# Patient Record
Sex: MALE | Race: WHITE | NOT HISPANIC OR LATINO | Employment: UNEMPLOYED | URBAN - METROPOLITAN AREA
[De-identification: names, ages, dates, MRNs, and addresses within clinical notes are randomized per-mention and may not be internally consistent; named-entity substitution may affect disease eponyms.]

---

## 2020-03-27 PROBLEM — R10.9 ABDOMINAL PAIN: Status: ACTIVE | Noted: 2020-03-27

## 2020-05-12 PROBLEM — M54.2 NECK PAIN: Status: ACTIVE | Noted: 2020-05-12

## 2020-05-12 PROBLEM — M25.512 LEFT SHOULDER PAIN: Status: ACTIVE | Noted: 2020-05-12

## 2020-08-26 ENCOUNTER — TELEPHONE (OUTPATIENT)
Dept: RHEUMATOLOGY | Facility: CLINIC | Age: 45
End: 2020-08-26

## 2020-08-26 NOTE — TELEPHONE ENCOUNTER
Pt sister Shanita Hood will set up pt portal. He has been scheduled for the first available with Dr. Irvin and added to the wait list

## 2020-10-06 PROBLEM — M54.2 NECK PAIN: Status: RESOLVED | Noted: 2020-05-12 | Resolved: 2020-10-06

## 2020-10-06 PROBLEM — M25.512 LEFT SHOULDER PAIN: Status: RESOLVED | Noted: 2020-05-12 | Resolved: 2020-10-06

## 2020-12-03 PROBLEM — M54.12 CERVICAL RADICULOPATHY: Status: ACTIVE | Noted: 2020-12-03

## 2021-03-11 ENCOUNTER — TELEPHONE (OUTPATIENT)
Dept: RHEUMATOLOGY | Facility: CLINIC | Age: 46
End: 2021-03-11

## 2021-03-29 ENCOUNTER — TELEPHONE (OUTPATIENT)
Dept: ORTHOPEDICS | Facility: CLINIC | Age: 46
End: 2021-03-29

## 2021-04-29 ENCOUNTER — PATIENT MESSAGE (OUTPATIENT)
Dept: RESEARCH | Facility: HOSPITAL | Age: 46
End: 2021-04-29

## 2021-05-03 ENCOUNTER — TELEPHONE (OUTPATIENT)
Dept: RHEUMATOLOGY | Facility: CLINIC | Age: 46
End: 2021-05-03

## 2021-05-03 ENCOUNTER — OFFICE VISIT (OUTPATIENT)
Dept: RHEUMATOLOGY | Facility: CLINIC | Age: 46
End: 2021-05-03
Payer: MEDICAID

## 2021-05-03 DIAGNOSIS — L40.9 PSORIASIS: Primary | ICD-10-CM

## 2021-05-03 DIAGNOSIS — M45.0 ANKYLOSING SPONDYLITIS OF MULTIPLE SITES IN SPINE: ICD-10-CM

## 2021-05-03 DIAGNOSIS — E03.9 ACQUIRED HYPOTHYROIDISM: ICD-10-CM

## 2021-05-03 DIAGNOSIS — F41.8 ANXIETY WITH DEPRESSION: ICD-10-CM

## 2021-05-03 DIAGNOSIS — R79.89 LFT ELEVATION: ICD-10-CM

## 2021-05-03 DIAGNOSIS — K21.9 GASTROESOPHAGEAL REFLUX DISEASE WITHOUT ESOPHAGITIS: ICD-10-CM

## 2021-05-03 DIAGNOSIS — I77.9: ICD-10-CM

## 2021-05-03 PROCEDURE — 99212 OFFICE O/P EST SF 10 MIN: CPT | Mod: PBBFAC,PO,25 | Performed by: INTERNAL MEDICINE

## 2021-05-03 PROCEDURE — 99205 OFFICE O/P NEW HI 60 MIN: CPT | Mod: 25,S$PBB,, | Performed by: INTERNAL MEDICINE

## 2021-05-03 PROCEDURE — 99205 PR OFFICE/OUTPT VISIT, NEW, LEVL V, 60-74 MIN: ICD-10-PCS | Mod: 25,S$PBB,, | Performed by: INTERNAL MEDICINE

## 2021-05-03 PROCEDURE — 99999 PR PBB SHADOW E&M-EST. PATIENT-LVL II: ICD-10-PCS | Mod: PBBFAC,,, | Performed by: INTERNAL MEDICINE

## 2021-05-03 PROCEDURE — 96372 THER/PROPH/DIAG INJ SC/IM: CPT | Mod: PBBFAC,PO

## 2021-05-03 PROCEDURE — 99999 PR PBB SHADOW E&M-EST. PATIENT-LVL II: CPT | Mod: PBBFAC,,, | Performed by: INTERNAL MEDICINE

## 2021-05-03 RX ORDER — THYROID 30 MG/1
30 TABLET ORAL
Qty: 30 TABLET | Refills: 11 | Status: SHIPPED | OUTPATIENT
Start: 2021-05-03 | End: 2022-05-04

## 2021-05-03 RX ORDER — DEXLANSOPRAZOLE 60 MG/1
1 CAPSULE, DELAYED RELEASE ORAL DAILY
COMMUNITY
Start: 2021-04-08

## 2021-05-03 RX ORDER — KETOROLAC TROMETHAMINE 30 MG/ML
60 INJECTION, SOLUTION INTRAMUSCULAR; INTRAVENOUS
Status: COMPLETED | OUTPATIENT
Start: 2021-05-03 | End: 2021-05-03

## 2021-05-03 RX ORDER — CLONAZEPAM 0.5 MG/1
0.5 TABLET ORAL
COMMUNITY

## 2021-05-03 RX ORDER — DICLOFENAC SODIUM AND MISOPROSTOL 75; 200 MG/1; UG/1
1 TABLET, DELAYED RELEASE ORAL 2 TIMES DAILY
Qty: 60 TABLET | Refills: 11 | Status: SHIPPED | OUTPATIENT
Start: 2021-05-03 | End: 2022-05-17

## 2021-05-03 RX ORDER — CALCIUM CITRATE/VITAMIN D3 200MG-6.25
TABLET ORAL
COMMUNITY
Start: 2021-04-08 | End: 2022-06-13

## 2021-05-03 RX ORDER — CYANOCOBALAMIN 1000 UG/ML
1000 INJECTION, SOLUTION INTRAMUSCULAR; SUBCUTANEOUS
Status: COMPLETED | OUTPATIENT
Start: 2021-05-03 | End: 2021-05-03

## 2021-05-03 RX ORDER — METFORMIN HYDROCHLORIDE EXTENDED-RELEASE TABLETS 1000 MG/1
TABLET, FILM COATED, EXTENDED RELEASE ORAL
COMMUNITY
Start: 2021-04-08

## 2021-05-03 RX ADMIN — KETOROLAC TROMETHAMINE 60 MG: 60 INJECTION, SOLUTION INTRAMUSCULAR at 11:05

## 2021-05-03 RX ADMIN — CYANOCOBALAMIN 1000 MCG: 1000 INJECTION, SOLUTION INTRAMUSCULAR at 11:05

## 2021-05-03 ASSESSMENT — ROUTINE ASSESSMENT OF PATIENT INDEX DATA (RAPID3)
PAIN SCORE: 5.5
TOTAL RAPID3 SCORE: 4.17
PATIENT GLOBAL ASSESSMENT SCORE: 5
MDHAQ FUNCTION SCORE: 0.6
PSYCHOLOGICAL DISTRESS SCORE: 2.2

## 2021-05-05 ENCOUNTER — TELEPHONE (OUTPATIENT)
Dept: RHEUMATOLOGY | Facility: CLINIC | Age: 46
End: 2021-05-05

## 2021-05-05 ENCOUNTER — DOCUMENTATION ONLY (OUTPATIENT)
Dept: RHEUMATOLOGY | Facility: CLINIC | Age: 46
End: 2021-05-05

## 2021-05-12 ENCOUNTER — PATIENT MESSAGE (OUTPATIENT)
Dept: RESEARCH | Facility: HOSPITAL | Age: 46
End: 2021-05-12

## 2021-07-12 ENCOUNTER — OFFICE VISIT (OUTPATIENT)
Dept: RHEUMATOLOGY | Facility: CLINIC | Age: 46
End: 2021-07-12
Payer: MEDICAID

## 2021-07-12 DIAGNOSIS — K90.41 NCGS (NON-CELIAC GLUTEN SENSITIVITY): ICD-10-CM

## 2021-07-12 DIAGNOSIS — L40.9 PSORIASIS: Primary | ICD-10-CM

## 2021-07-12 DIAGNOSIS — E06.3 HASHIMOTO'S THYROIDITIS: ICD-10-CM

## 2021-07-12 DIAGNOSIS — M45.0 ANKYLOSING SPONDYLITIS OF MULTIPLE SITES IN SPINE: ICD-10-CM

## 2021-07-12 PROCEDURE — 99215 PR OFFICE/OUTPT VISIT, EST, LEVL V, 40-54 MIN: ICD-10-PCS | Mod: 95,,, | Performed by: INTERNAL MEDICINE

## 2021-07-12 PROCEDURE — 99215 OFFICE O/P EST HI 40 MIN: CPT | Mod: 95,,, | Performed by: INTERNAL MEDICINE

## 2021-07-12 RX ORDER — HYDROXYCHLOROQUINE SULFATE 200 MG/1
200 TABLET, FILM COATED ORAL 2 TIMES DAILY
Qty: 60 TABLET | Refills: 6 | Status: CANCELLED | OUTPATIENT
Start: 2021-07-12 | End: 2021-08-11

## 2021-07-15 ENCOUNTER — TELEPHONE (OUTPATIENT)
Dept: RHEUMATOLOGY | Facility: CLINIC | Age: 46
End: 2021-07-15

## 2021-07-18 RX ORDER — HYDROXYCHLOROQUINE SULFATE 200 MG/1
200 TABLET, FILM COATED ORAL 2 TIMES DAILY
Qty: 60 TABLET | Refills: 3 | Status: SHIPPED | OUTPATIENT
Start: 2021-07-18

## 2022-03-10 ENCOUNTER — PATIENT MESSAGE (OUTPATIENT)
Dept: RHEUMATOLOGY | Facility: CLINIC | Age: 47
End: 2022-03-10
Payer: COMMERCIAL

## 2022-04-07 ENCOUNTER — DOCUMENTATION ONLY (OUTPATIENT)
Dept: PHARMACY | Facility: CLINIC | Age: 47
End: 2022-04-07
Payer: COMMERCIAL

## 2022-04-07 NOTE — PROGRESS NOTES
RECEIVED A PA FOR PT FOR DICLOFENAC- MISOPROSTOL 75-0.2MG. NO PA WAS NEEDED    RX NOTIFIED    JAVIER FERNANDEZ CPHT  MED ACCESS  04/07/22

## 2022-05-13 DIAGNOSIS — F41.8 ANXIETY WITH DEPRESSION: ICD-10-CM

## 2022-05-13 DIAGNOSIS — L40.9 PSORIASIS: ICD-10-CM

## 2022-05-13 DIAGNOSIS — I77.9: ICD-10-CM

## 2022-05-13 DIAGNOSIS — M45.0 ANKYLOSING SPONDYLITIS OF MULTIPLE SITES IN SPINE: ICD-10-CM

## 2022-05-13 DIAGNOSIS — K21.9 GASTROESOPHAGEAL REFLUX DISEASE WITHOUT ESOPHAGITIS: ICD-10-CM

## 2022-05-13 DIAGNOSIS — E03.9 ACQUIRED HYPOTHYROIDISM: ICD-10-CM

## 2022-05-17 RX ORDER — DICLOFENAC SODIUM AND MISOPROSTOL 75; 200 MG/1; UG/1
1 TABLET, DELAYED RELEASE ORAL 2 TIMES DAILY
Qty: 60 TABLET | Refills: 3 | Status: SHIPPED | OUTPATIENT
Start: 2022-05-17 | End: 2022-06-07

## 2022-05-18 ENCOUNTER — DOCUMENTATION ONLY (OUTPATIENT)
Dept: PHARMACY | Facility: CLINIC | Age: 47
End: 2022-05-18
Payer: COMMERCIAL

## 2022-05-18 NOTE — PROGRESS NOTES
PA submitted for DOCLOFENAC-MISOPROSTOL 75-0.2 MG AND APPROVED    Key:  [id number]    JAVIER FERNANDEZ,RAJHT  MED ACCESS  5/18/2022

## 2022-06-07 ENCOUNTER — OFFICE VISIT (OUTPATIENT)
Dept: ORTHOPEDICS | Facility: CLINIC | Age: 47
End: 2022-06-07
Payer: COMMERCIAL

## 2022-06-07 DIAGNOSIS — M18.12 ARTHRITIS OF CARPOMETACARPAL (CMC) JOINT OF LEFT THUMB: Primary | ICD-10-CM

## 2022-06-07 DIAGNOSIS — M18.11 ARTHRITIS OF CARPOMETACARPAL (CMC) JOINT OF RIGHT THUMB: ICD-10-CM

## 2022-06-07 PROCEDURE — 1159F PR MEDICATION LIST DOCUMENTED IN MEDICAL RECORD: ICD-10-PCS | Mod: CPTII,S$GLB,, | Performed by: PHYSICIAN ASSISTANT

## 2022-06-07 PROCEDURE — 1160F PR REVIEW ALL MEDS BY PRESCRIBER/CLIN PHARMACIST DOCUMENTED: ICD-10-PCS | Mod: CPTII,S$GLB,, | Performed by: PHYSICIAN ASSISTANT

## 2022-06-07 PROCEDURE — 1160F RVW MEDS BY RX/DR IN RCRD: CPT | Mod: CPTII,S$GLB,, | Performed by: PHYSICIAN ASSISTANT

## 2022-06-07 PROCEDURE — 99204 OFFICE O/P NEW MOD 45 MIN: CPT | Mod: S$GLB,,, | Performed by: PHYSICIAN ASSISTANT

## 2022-06-07 PROCEDURE — 99204 PR OFFICE/OUTPT VISIT, NEW, LEVL IV, 45-59 MIN: ICD-10-PCS | Mod: S$GLB,,, | Performed by: PHYSICIAN ASSISTANT

## 2022-06-07 PROCEDURE — 1159F MED LIST DOCD IN RCRD: CPT | Mod: CPTII,S$GLB,, | Performed by: PHYSICIAN ASSISTANT

## 2022-06-07 PROCEDURE — 99999 PR PBB SHADOW E&M-EST. PATIENT-LVL III: CPT | Mod: PBBFAC,,, | Performed by: PHYSICIAN ASSISTANT

## 2022-06-07 PROCEDURE — 99999 PR PBB SHADOW E&M-EST. PATIENT-LVL III: ICD-10-PCS | Mod: PBBFAC,,, | Performed by: PHYSICIAN ASSISTANT

## 2022-06-07 RX ORDER — MELOXICAM 15 MG/1
15 TABLET ORAL DAILY
Qty: 28 TABLET | Refills: 0 | Status: SHIPPED | OUTPATIENT
Start: 2022-06-07

## 2022-06-07 NOTE — LETTER
June 7, 2022      [facility]  [address]  [place] [address]  Phone: [phone]       Patient: Lucía Thurston   YOB: 1975  Date of Visit: 06/07/2022    To Whom It May Concern:    Rosio Thurston  was at [facility] on 06/07/2022. The patient may return to work on 06/08/2022 with no restrictions restrictions.     Sincerely,      Clyde Gibson PA-C

## 2022-06-07 NOTE — PROGRESS NOTES
2022    Chief Complaint:  No chief complaint on file.      HPI:  Lucía Thurston is a 47 y.o. male, who presents to clinic today evaluation of his bilateral hand pain.  States pain started approximately 2 weeks ago.  States pain has been constant since that time.  States pain is located at the base of the bilateral thumbs.  States the left is worse than the right.  States he also has some pain over the radial styloid of the left wrist, that radiates into his thumb.  States he went to the emergency department approximately 2 days ago, where x-rays were taken.  States x-ray showed no fracture, but showed arthritis of the bilateral CMC joints.  Denies any paresthesias.  Denies any acute injuries.  Additionally, states he does not have primary care provider.  Denies any other complaints this time    PMHX:  Past Medical History:   Diagnosis Date    Thyroid disease        PSHX:  Past Surgical History:   Procedure Laterality Date    CARPAL TUNNEL RELEASE Right     CARPAL TUNNEL RELEASE  right hand 15 years ago left hand     ESOPHAGOGASTRODUODENOSCOPY      ESOPHAGOGASTRODUODENOSCOPY N/A 3/27/2020    Procedure: EGD (ESOPHAGOGASTRODUODENOSCOPY);  Surgeon: Cuauhtemoc Chavez MD;  Location: [facility];  Service: Endoscopy;  Laterality: N/A;    KNEE ARTHROSCOPY W/ MENISCAL REPAIR Left     KNEE SURGERY  2009    NECK SURGERY  2020       FMHX:  Family History   Problem Relation Age of Onset    Asthma Father     Heart disease Father     Crohn's disease Sister     Diabetes Mellitus Sister        SOCHX:  Social History     Tobacco Use    Smoking status: Former Smoker     Types: Cigarettes     Quit date: 5/3/2016     Years since quittin.0    Smokeless tobacco: Current User   Substance Use Topics    Alcohol use: Never       ALLERGIES:  Gabapentin, Lyrica [pregabalin], Lyrica [pregabalin], and Pcn [penicillins]    CURRENT MEDICATIONS:  Current Outpatient Medications on File Prior to Visit   Medication Sig  Dispense Refill    ARMOUR THYROID 30 mg Tab Take 1 tablet (30 mg total) by mouth before breakfast. 30 tablet 11    clonazePAM (KLONOPIN) 0.5 MG tablet Take 0.5 mg by mouth 3 (three) times daily as needed.      clonazePAM (KLONOPIN) 0.5 MG tablet Take 0.5 mg by mouth.      DEXILANT 60 mg capsule Take 1 capsule by mouth once daily.      DEXILANT 60 mg capsule Take 1 capsule by mouth once daily.      diclofenac-misoprostol  mg-mcg (ARTHROTEC 75)  mg-mcg per tablet Take 1 tablet by mouth 2 (two) times daily. 60 tablet 3    fluticasone propionate (FLONASE) 50 mcg/actuation nasal spray 2 sprays (100 mcg total) by Each Nostril route 2 (two) times daily as needed for Rhinitis. 15 g 0    hydrOXYchloroQUINE (PLAQUENIL) 200 mg tablet Take 1 tablet (200 mg total) by mouth 2 (two) times daily. 60 tablet 3    ibuprofen-famotidine (DUEXIS) 800-26.6 mg Tab Take 1 tablet by mouth 3 (three) times daily. 18 tablet 0    metFORMIN (FORTAMET) 1,000 mg 24hr tablet TAKE ONE TABLET BY MOUTH DAILY WITH a meal      metFORMIN (FORTAMET) 1,000 mg 24hr tablet TAKE ONE TABLET BY MOUTH DAILY WITH a meal      mirtazapine (REMERON) 15 MG tablet TAKE 1 TABLET BY MOUTH AT BEDTIME FOR 90 DAYS      mupirocin (BACTROBAN) 2 % ointment APPLY A SMALL AMOUNT TO AFFECTED AREA THREE TIMES DAILY FOR 10 DAYS      sertraline (ZOLOFT) 100 MG tablet Take 100 mg by mouth once daily.      TRUE METRIX GLUCOSE TEST STRIP Strp USE TWICE DAILY AS DIRECTED      TRUE METRIX GLUCOSE TEST STRIP Strp USE TWICE DAILY AS DIRECTED       No current facility-administered medications on file prior to visit.       REVIEW OF SYSTEMS:  Review of Systems   Constitutional: Negative.    HENT: Negative.    Eyes: Negative.    Respiratory: Negative.    Cardiovascular: Negative.    Gastrointestinal: Negative.    Genitourinary: Negative.    Musculoskeletal: Positive for joint pain.   Skin: Negative.    Neurological: Negative.    Endo/Heme/Allergies: Negative.     Psychiatric/Behavioral: Negative.        GENERAL PHYSICAL EXAM:   There were no vitals taken for this visit.   GEN: well developed, well nourished, no acute distress   HENT: Normocephalic, atraumatic   EYES: No discharge, conjunctiva normal   NECK: Supple, non-tender   PULM: No wheezing, no respiratory distress   CV: RRR   ABD: Soft, non-tender    ORTHO EXAM:   Examination of the right hand reveals no edema, erythema, ecchymosis, or skin breakdown.  Able make composite fist and fully extend all fingers.  Tenderness to palpation of the 1st CMC joint.  Positive grind test the 1st CMC joint.  No significant deformity noted of the 1st CMC joint.  No tenderness noted to palpation of the remainder of the hand or wrist.  5/5 /intrinsic strength.  5/5 thenar strength.  Normal sensation in the radial, ulnar, median nerve distributions.  Capillary refill less than 2 seconds in all fingers.   Examination of the left hand reveals no edema, erythema, ecchymosis, or skin breakdown.  Able to make composite fist and fully extend all fingers.  Tenderness to palpation of the 1st CMC joint.  Positive grind test of the 1st CMC joint.  No significant deformity noted of the 1st CMC joint.  Tenderness with palpation over the 1st extensor compartment. Positive Finkelstein's test. No tenderness to palpation over the remainder of the hand/wrist.  Normal sensation in the radial, ulnar, median nerve distributions.  Capillary refill less than 2 seconds in all fingers.    RADIOLOGY:   X-rays of the left hand were taken approximally 2 days ago the [facility] Emergency Department.  X-rays reviewed by myself in clinic today.  Imaging showed no acute fracture or dislocation.  No subluxation.  Presence of moderate degenerative changes the 1st CMC joint.  No other significant degenerative changes noted.  No radiopaque foreign body or mass noted.  No osseous destructive/erosive processes noted.  No other significant bony abnormalities  noted.   X-rays of the right hand were taken approximately 2 days ago at the [facility] Emergency Department.  X-rays were reviewed by myself in clinic today.  Imaging showed no acute fracture or dislocation.  No subluxation.  Presence of mild degenerative changes of the 1st CMC joint.  No other significant degenerative changes noted.  No radiopaque foreign body or mass is noted.  No osseous destructive/wrist processes noted.  No other significant bony abnormalities noted.    ASSESSMENT:   Osteoarthritis of the 1st CMC joint of the right thumb, osteoarthritis of the 1st CMC joint of the left thumb, De Quervain's tenosynovitis of the left wrist    PLAN:  1. I discussed with Lucía Thurston the de Quervain tenosynovitis and osteoarthritis pathology and treatment options in detail during today's visit.  After treatment options were discussed, we discussed the best course of action this time is to perform a short course of oral prescription NSAIDs, as well as begin splinting of the bilateral 1st CMC joints via meta  splints.  We also discussed the importance of referring him to our primary care provider to have labs drawn, as he is a diabetic and has not had any labs drawn any months. We discussed considering that they have no kidney dysfunction, not currently taking blood thinners, no uncontrolled GERD/peptic ulcer disease, no longstanding history of cardiac disease, and no adverse effects to NSAIDs that they are a candidate for oral prescription NSAID therapy.  We discussed that he does have occasional GERD, but is controlled with intermittent Pepcid.  We discussed while taking the oral prescription NSAIDs that he should take the Pepcid with it if he has any stomach issues.  We discussed for the de Quervain tenosynovitis, we would hold off on performing a steroid injection until he has labs drawn showing that his diabetes is under control.  He verbally agreed with the treatment plan.    2. He was referred  to physical therapy in clinic today to be fitted for bilateral mammogram splints.    3. He was prescribed Mobic 15 mg to be taken once daily.  He was instructed to discontinue the medication for any adverse effects.  Verbalized understanding.    4. He was referred the primary care in clinic today.      5. He was provided a note for work releasing him to work without any restrictions in clinic today.    6. I would like him follow-up in clinic in 6 weeks for repeat evaluation.  He was instructed to contact the clinic for any problems or concerns in the interim.

## 2022-06-13 PROBLEM — E07.9 DISEASE OF THYROID GLAND: Status: ACTIVE | Noted: 2019-11-15

## 2022-06-13 PROBLEM — E66.9 OBESITY (BMI 30.0-34.9): Status: ACTIVE | Noted: 2022-06-13

## 2022-06-13 PROBLEM — F43.10 PTSD (POST-TRAUMATIC STRESS DISORDER): Status: ACTIVE | Noted: 2019-11-15

## 2022-06-13 PROBLEM — E11.9 DM (DIABETES MELLITUS), TYPE 2: Status: ACTIVE | Noted: 2020-05-24

## 2022-06-13 PROBLEM — Z78.9 ALCOHOL USE: Status: ACTIVE | Noted: 2022-06-13

## 2022-06-13 PROBLEM — R10.9 ABDOMINAL PAIN: Status: RESOLVED | Noted: 2020-03-27 | Resolved: 2022-06-13

## 2022-06-20 ENCOUNTER — CLINICAL SUPPORT (OUTPATIENT)
Dept: REHABILITATION | Facility: HOSPITAL | Age: 47
End: 2022-06-20
Payer: COMMERCIAL

## 2022-06-20 DIAGNOSIS — M18.11 ARTHRITIS OF CARPOMETACARPAL (CMC) JOINT OF RIGHT THUMB: ICD-10-CM

## 2022-06-20 DIAGNOSIS — M25.632 STIFFNESS OF WRIST JOINT, LEFT: ICD-10-CM

## 2022-06-20 DIAGNOSIS — M25.649 THUMB JOINT STIFFNESS: ICD-10-CM

## 2022-06-20 DIAGNOSIS — M79.645 PAIN OF BOTH THUMBS: ICD-10-CM

## 2022-06-20 DIAGNOSIS — M18.12 ARTHRITIS OF CARPOMETACARPAL (CMC) JOINT OF LEFT THUMB: ICD-10-CM

## 2022-06-20 DIAGNOSIS — M25.631 STIFFNESS OF RIGHT WRIST JOINT: ICD-10-CM

## 2022-06-20 DIAGNOSIS — Z74.09 IMPAIRED MOBILITY AND ADLS: ICD-10-CM

## 2022-06-20 DIAGNOSIS — Z78.9 IMPAIRED MOBILITY AND ADLS: ICD-10-CM

## 2022-06-20 DIAGNOSIS — M79.644 PAIN OF BOTH THUMBS: ICD-10-CM

## 2022-06-20 PROCEDURE — 97760 ORTHOTIC MGMT&TRAING 1ST ENC: CPT | Mod: PO

## 2022-06-20 PROCEDURE — 97110 THERAPEUTIC EXERCISES: CPT | Mod: PO

## 2022-06-20 PROCEDURE — 97165 OT EVAL LOW COMPLEX 30 MIN: CPT | Mod: PO

## 2022-06-21 NOTE — PLAN OF CARE
[facility] Occupational Therapy  Initial Evaluation     Date: 6/20/2022  Patient: Lucía Thurston  Chart Number: [id number]    Treatment Diagnosis:   1. Arthritis of carpometacarpal (CMC) joint of left thumb  Ambulatory referral/consult to Physical/Occupational Therapy   2. Arthritis of carpometacarpal (CMC) joint of right thumb  Ambulatory referral/consult to Physical/Occupational Therapy   3. Stiffness of right wrist joint     4. Stiffness of wrist joint, left     5. Thumb joint stiffness     6. Impaired mobility and ADLs     7. Pain of both thumbs         Physician: Clyde Gibson, KAILA    Physician Orders:   Note    Patient has bilateral 1st CMC joint osteoarthritis.  Patient requires certified hand therapy to fit him for bilateral meta  splints.     Duration:  1 visit     Frequency:  P.r.n.     Please fit the patient for bilateral meta  splints.  Thanks!            Medical Diagnosis:   Diagnosis   M18.12 (ICD-10-CM) - Arthritis of carpometacarpal (CMC) joint of left thumb   M18.11 (ICD-10-CM) - Arthritis of carpometacarpal (CMC) joint of right thumb       Evaluation Date: 6/20/2022  Insurance Authorization period Expiration:  Calendar year  Plan of Care Expiration Period: 30 days = 7/20/22  Next Re-assessment: 30 days (7/20/2022) and/or 10th visit  Date of Return Referring Provider TBD    Visit # / Visits Authortized: 1 / TBD  # No shows 0 / # Cancellations: 0  Time In:1515  Time Out: 1600  Total Billable Time: 45 minutes    Precautions: Standard  Surgery: N/A      S/P: Chronic    Subjective     Involved Side: Blateral  Dominant Side: Right  Date of Onset: Several months    Mechanism of Injury/ History of Current Condition: Worsening pain B thumbs    Other Surgical/PMHX involved UE: CTR on L approx 2 yrs ago and on R approx 15 yrs ago.    Imaging: X rays:   Last X ray from: 6/05/2022: Degenerative changes, a fracture is not seen     Previous Therapy:  None reported    Patient's Goals  for Therapy:  To get B thumbs to not hurt so much, be helder to perform ADL and work tasks with less pain and protect thumbs from getting worse.    Pain:  Functional Pain Scale Rating 0-10:   2/10 on average  2/10 at best  8-9/10 at worst  Location: B thumbs   Description: Aching and Sharp,   Aggravating Factors: grasp and pinch  Easing Factors: Rest    Occupation:    Title: Construction work  Former work status: Full  Current work status: Full  Physical demand:  Heavy to very heavy      Functional Limitations/Social History:    Previous functional status includes: Independent with all ADLs/IADLs.    Current FunctionalStatus   Home/Living environment : Pt lives at home.    Limitation of Functional Status as follows:   ADLs/IADLs: Moderate overall difficulty reported with basic ADL/IADL tasks.               Pt reports the most difficulty with Pain.   See Quickdash results below for further related to UE function.    Past Medical History/Physical Systems Review:   Lucía Thurston  has a past medical history of Thyroid disease.    Lucía Thurston  has a past surgical history that includes Esophagogastroduodenoscopy; Carpal tunnel release (Right); Knee arthroscopy w/ meniscal repair (Left); Esophagogastroduodenoscopy (N/A, 3/27/2020); Neck surgery (09/2020); Carpal tunnel release (right hand 15 years ago left hand 2021); and Knee surgery (2009).    Lucía has a current medication list which includes the following prescription(s): armour thyroid, azelastine, clonazepam, dexilant, famotidine, fluticasone propionate, hydroxychloroquine, levocetirizine, meloxicam, metformin, mirtazapine, mupirocin, ondansetron, sertraline, and vyvanse.    Review of patient's allergies indicates:   Allergen Reactions    Penicillins Anaphylaxis    Gabapentin Other (See Comments)     Lethargy, inability to function    Lyrica [pregabalin] Other (See Comments)     Lethargy, inability to function      Lyrica [pregabalin] Other (See Comments)      Lethargy, inability to function          Objective       CMS Impairment/Limitation/Restriction for Quick DASH Survey    Therapist reviewed Quick DASH scores on date of Eval  Quick DASH documents entered into [employer] - see Media section for original.    The Quick DASH Questionnaire- The following scores are based on patient reported assessment at the time Eval for occupational therapy.    Activity:  1. Open a tight jar: 3 Difficulty  2. Do heavy household chores: 3 Difficulty  3. Carry a shopping bag or briefcase: 1 Difficulty  4. Wash your back: 2 Difficulty  5.Use a knife to cut food: 2 Difficulty  6.. Recreational activities requiring force through arm: 2 Difficulty  7. Social Limitation: 3 Limited  8. Work/ADL Limitation: 2 Limited  Severity of Symptoms (over the past week)  9. Pain: 4  10. Tingling: 3  11. Sleeping Limitation: 3 Difficulty    Limitation Score: 38.63%    Scale  1= NO (Difficulty or Limitatons)  2= Mild (Difficulty/Limitations)  3= Moderate (Difficulty/Limitations)  4= Severe (Difficulty/Limitations)  5= Extreme/Unable and/or can't sleep (Difficulty/Limitations)           Observation/Inspection/Wound/Incision/Scar   Mild edema B thumbs/wrists    Sensation: Grossly WNL,    Edema:          Circumferential (in cm) L R   Proximal Wrist Crease 23.0 23.0   MPs NT NT   Mid P1 of  Long Finger NT NT      AROM Hand: Tip to palm/DPC digits 1-5 (in cm)   Left Right   1st -2.0 Intact   2nd 2-5 intact  2-5 intact    3rd     4th     5th       AROM Wrist/Forearm:               Left              Right   Wrist Ext/Flex:  60/WL° Ext/Flex:  50/WNL°        Forearm Pron/Sup  WNL° Pro/Sup:  WNL°     AROM Elbow:                Left              Right   Elbow Ext/Flex  WNL° Ext/Flex:  WNL°         Manual Muscle Test: NT                                      Special and/or Standardized Tests:  NT      Treatment     Treatment Time In: 1535  Treatment Time Out: 1600  Total Treatment time separate from Evaluation time: 25  minutes.    Lucía received therapeutic exercises for 10 minutes including: Initial Home Exercise Program Instruction.  Thumb, wrist and hand AROM  Weighted wrist stretches with 1#    Orthotic mgmt and train: 15 min for sizing/ed for L and R push metagrip braces: L3 and R3 (large braces needed)    Home Exercise Program/Education:  Issued HEP (see patient instructions in EMR) and educated on modality use for pain management . Exercises were reviewed and Lucía was able to demonstrate them prior to the end of the session.   Pt received a written copy of exercises to perform at home. Lucía demonstrated good  understanding of the education provided.  Pt was advised to perform these exercises free of pain, and to stop performing them if pain occurs.    Patient/Family Education: role of OT, goals for OT, scheduling/cancellations - pt verbalized understanding. Discussed insurance limitations with patient.    Additional Education provided: Conservative mgmt, joint protection recommnedations    Assessment     Lucía Thurston is a 47 y.o. male referred to outpatient occupational/hand therapy and presents with a medical diagnosis of B thumb CMS OA resulting in, pain, edema, decreased A/PROM, strength, and functional use of involved upper extremity/extremities) and demonstrates limitations as described in the chart below.     The patient's rehab potential is good for the goals listed below.    No anticipated barriers to occupational therapy.  Pt has no cultural, educational or language barriers to learning provided.    Profile and History Assessment of Occupational Performance Level of Clinical Decision Making Complexity Score   Occupational Profile:   Lucía Thurston is a 47 y.o. male who was Independent with all ADL/IADL prior to onset of symptoms/injury . Pt is currently  reporting Mod to Max difficulty with BUE use affecting is daily functional abilities. His main goal for therapy is regain Independent use of  SHARON.    Comorbidities:    has a past medical history of Thyroid disease.  Medical and Therapy History Review:   Brief               Performance Deficits    Physical:  Joint Mobility  Muscle Power/Strength  Muscle Endurance  Edema   Strength  Pinch Strength  Pain    Cognitive:  No Deficits    Psychosocial:    No Deficits     Clinical Decision Making:  low    Assessment Process:  Detailed Assessments    Modification/Need for Assistance:  Minimal-Moderate Modifications/Assistance    Intervention Selection:  Limited Treatment Options       low  Based on PMHX, co morbidities , data from assessments and functional level of assistance required with task and clinical presentation directly impacting function.       The following goals were discussed with the patient and patient is in agreement with them as to be addressed in the treatment plan.     Goals:     Short Term Goals: (30 days, 7/20/2022, and/or 10th visit) unless otherwise noted below.  1. Pt will be independent with HEP, brace  wear and care, and any further conservative mgmt recommendations for hand/thubm OA.in 2 visits.    Long Term Goals: (by discharge)  1. Pt will be independent with HEP, brace  wear and care, and any further conservative mgmt recommendations for hand/thubm OA.in 2 visits.    Plan   Plan of care expiration: 30 days = 7/20/2022     Outpatient Occupational Therapy 0-1 times weekly through current poc expiration date, to include the following interventions:     Moist heat, cold packs, paraffin, fluidotherapy, US, edema control, scar mobilization/scar massage, manual therapy/joint mobilizations,A/PROM, therapeutic exercises/activities, strengthening, desensitization/sensory re-education, orthotic fitting/fabrication/training  PRN, joint protections/energry conservation/adaptive equipment/activity modification HEP/education as well as any other treatments deemed necessary based on the patient's needs or progress.     Pt may be discharged prior  to poc expiration date if all goals/desired outcome met or if max rehabilitation potential has been achieved.    Updates Next Visit: To review HEP understanding and compliance and progress with OT tx as tolerated.    MADAN Pina, ROBERT

## 2022-10-14 RX ORDER — DICLOFENAC SODIUM AND MISOPROSTOL 75; 200 MG/1; UG/1
1 TABLET, DELAYED RELEASE ORAL 2 TIMES DAILY
Qty: 60 TABLET | Refills: 11 | OUTPATIENT
Start: 2022-10-14 | End: 2023-10-14

## 2023-06-08 DIAGNOSIS — K21.9 GASTROESOPHAGEAL REFLUX DISEASE WITHOUT ESOPHAGITIS: ICD-10-CM

## 2023-06-08 DIAGNOSIS — F41.8 ANXIETY WITH DEPRESSION: ICD-10-CM

## 2023-06-08 DIAGNOSIS — E03.9 ACQUIRED HYPOTHYROIDISM: ICD-10-CM

## 2023-06-08 DIAGNOSIS — L40.9 PSORIASIS: ICD-10-CM

## 2023-06-08 DIAGNOSIS — I77.9: ICD-10-CM

## 2023-06-08 DIAGNOSIS — M45.0 ANKYLOSING SPONDYLITIS OF MULTIPLE SITES IN SPINE: ICD-10-CM

## 2023-06-08 RX ORDER — THYROID, PORCINE 30 MG/1
TABLET ORAL
Qty: 90 TABLET | Refills: 0 | OUTPATIENT
Start: 2023-06-08

## 2023-07-11 ENCOUNTER — TELEPHONE (OUTPATIENT)
Dept: RHEUMATOLOGY | Facility: CLINIC | Age: 48
End: 2023-07-11
Payer: COMMERCIAL

## 2023-07-11 NOTE — TELEPHONE ENCOUNTER
----- Message from Joann Diaz sent at 7/11/2023 11:36 AM CDT -----  Contact: pt  Type:  RX Refill Request    Who Called: pt  Refill or New Rx:refill  RX Name and Strength:ARMOUR THYROID 30 mg Tab  How is the patient currently taking it? (ex. 1XDay):as directed  Is this a 30 day or 90 day RX:30  Ordering Provider:Brandee  Would the patient rather a call back or a response via [facility]? Call back  Best Call Back Number:[phone]  Additional Information: Pt need refill and is currently working [facility]. Please call pt to get name of pharmacy to send it to.   Thanks

## 2023-08-23 DIAGNOSIS — M45.0 ANKYLOSING SPONDYLITIS OF MULTIPLE SITES IN SPINE: ICD-10-CM

## 2023-08-23 DIAGNOSIS — E03.9 ACQUIRED HYPOTHYROIDISM: ICD-10-CM

## 2023-08-23 DIAGNOSIS — I77.9: ICD-10-CM

## 2023-08-23 DIAGNOSIS — K21.9 GASTROESOPHAGEAL REFLUX DISEASE WITHOUT ESOPHAGITIS: ICD-10-CM

## 2023-08-23 DIAGNOSIS — L40.9 PSORIASIS: ICD-10-CM

## 2023-08-23 DIAGNOSIS — F41.8 ANXIETY WITH DEPRESSION: ICD-10-CM

## 2023-08-23 RX ORDER — THYROID 30 MG/1
30 TABLET ORAL
Qty: 30 TABLET | Refills: 11 | OUTPATIENT
Start: 2023-08-23

## 2023-08-23 NOTE — TELEPHONE ENCOUNTER
Pharmacy requesting refill on Bristol Thyroid 30mg  Pt's LOV 07/12/2021  Pt's NOV none scheduled  Medication pending

## 2024-04-10 ENCOUNTER — TELEPHONE (OUTPATIENT)
Dept: RHEUMATOLOGY | Facility: CLINIC | Age: 49
End: 2024-04-10
Payer: COMMERCIAL

## 2024-04-10 NOTE — TELEPHONE ENCOUNTER
----- Message from Callie Diaz sent at 4/9/2024  4:16 PM CDT -----  Regarding: appt access  Type:  Sooner Appointment Request    Caller is requesting a sooner appointment.  Caller declined first available appointment listed below.  Caller will not accept being placed on the waitlist and is requesting a message be sent to doctor.    Name of Caller:  pt   When is the first available appointment?  unk  Symptoms:  rx refill  Best Call Back Number:  [phone]   Additional Information:  requesting a appt and call back asap please advise thank you